# Patient Record
Sex: FEMALE | URBAN - METROPOLITAN AREA
[De-identification: names, ages, dates, MRNs, and addresses within clinical notes are randomized per-mention and may not be internally consistent; named-entity substitution may affect disease eponyms.]

---

## 2019-03-05 ENCOUNTER — OFFICE VISIT (OUTPATIENT)
Dept: URBAN - METROPOLITAN AREA CLINIC 81 | Facility: CLINIC | Age: 64
End: 2019-03-05
Payer: COMMERCIAL

## 2019-03-05 DIAGNOSIS — H25.13 AGE-RELATED NUCLEAR CATARACT, BILATERAL: ICD-10-CM

## 2019-03-05 DIAGNOSIS — H52.4 PRESBYOPIA: Primary | ICD-10-CM

## 2019-03-05 PROCEDURE — 92015 DETERMINE REFRACTIVE STATE: CPT | Performed by: OPTOMETRIST

## 2019-03-05 PROCEDURE — 92014 COMPRE OPH EXAM EST PT 1/>: CPT | Performed by: OPTOMETRIST

## 2019-03-05 ASSESSMENT — KERATOMETRY
OD: 44.63
OS: 44.50

## 2019-03-05 ASSESSMENT — INTRAOCULAR PRESSURE
OD: 14
OS: 14

## 2019-03-05 ASSESSMENT — VISUAL ACUITY
OS: 20/25
OD: 20/25

## 2019-03-05 NOTE — IMPRESSION/PLAN
Impression: Presbyopia: H52.4. Pt interested in cat surgery Plan: Discussed Dx with pt.  Rec hold off on new Rx pending results of Cat eval.

## 2019-03-05 NOTE — IMPRESSION/PLAN
Impression: Age-related nuclear cataract, bilateral: H25.13. Plan: Discussed diagnosis & treatment options with patient. Hold off on new gls, pt aware new glasses will not improve vision as much as cat surgery potentially could. Pt understands and wishes to proceed with surgery. Recommend CEIOL OD then OS. STANDARD IOL. Target: Distance. RL2. Schedule ASCAN then Pre Op with Dr. Prabhjot Arnold.